# Patient Record
Sex: FEMALE | Race: WHITE | NOT HISPANIC OR LATINO | Employment: OTHER | URBAN - METROPOLITAN AREA
[De-identification: names, ages, dates, MRNs, and addresses within clinical notes are randomized per-mention and may not be internally consistent; named-entity substitution may affect disease eponyms.]

---

## 2017-06-02 ENCOUNTER — INITIAL CONSULT (OUTPATIENT)
Dept: OPHTHALMOLOGY | Facility: CLINIC | Age: 67
End: 2017-06-02
Payer: MEDICARE

## 2017-06-02 ENCOUNTER — CLINICAL SUPPORT (OUTPATIENT)
Dept: OPHTHALMOLOGY | Facility: CLINIC | Age: 67
End: 2017-06-02
Payer: MEDICARE

## 2017-06-02 DIAGNOSIS — H47.323 DRUSEN OF BOTH OPTIC DISCS: ICD-10-CM

## 2017-06-02 DIAGNOSIS — H53.433 ARCUATE VISUAL FIELD DEFECT OF BOTH EYES: ICD-10-CM

## 2017-06-02 DIAGNOSIS — H40.1133 PRIMARY OPEN ANGLE GLAUCOMA OF BOTH EYES, SEVERE STAGE: ICD-10-CM

## 2017-06-02 PROCEDURE — 92083 EXTENDED VISUAL FIELD XM: CPT | Mod: 26,S$PBB,, | Performed by: OPHTHALMOLOGY

## 2017-06-02 PROCEDURE — 92083 EXTENDED VISUAL FIELD XM: CPT | Mod: PBBFAC | Performed by: OPHTHALMOLOGY

## 2017-06-02 PROCEDURE — 99999 PR PBB SHADOW E&M-EST. PATIENT-LVL III: CPT | Mod: PBBFAC,,, | Performed by: OPHTHALMOLOGY

## 2017-06-02 PROCEDURE — 99213 OFFICE O/P EST LOW 20 MIN: CPT | Mod: PBBFAC | Performed by: OPHTHALMOLOGY

## 2017-06-02 PROCEDURE — 92004 COMPRE OPH EXAM NEW PT 1/>: CPT | Mod: S$PBB,,, | Performed by: OPHTHALMOLOGY

## 2017-06-02 RX ORDER — LISINOPRIL 5 MG/1
5 TABLET ORAL
COMMUNITY

## 2017-06-02 RX ORDER — BRINZOLAMIDE 10 MG/ML
1 SUSPENSION/ DROPS OPHTHALMIC 3 TIMES DAILY
COMMUNITY

## 2017-06-02 RX ORDER — PNV NO.95/FERROUS FUM/FOLIC AC 28MG-0.8MG
1000 TABLET ORAL
COMMUNITY

## 2017-06-02 RX ORDER — FAMOTIDINE 40 MG/1
40 TABLET, FILM COATED ORAL
COMMUNITY

## 2017-06-02 RX ORDER — LANSOPRAZOLE 30 MG/1
30 CAPSULE, DELAYED RELEASE ORAL
COMMUNITY

## 2017-06-02 NOTE — LETTER
June 2, 2017      Ranjith Christensen MD  [address] [place] [address]           [facility]hthalmology  [address]  [place] [address]  Phone: [phone]  Fax: [phone]          Patient: Cande Adame   MR Number: [id number]   YOB: 1950   Date of Visit: 6/2/2017       Dear Dr. Ranjith Christensen:    Thank you for referring Cande Adame to me for evaluation. Attached you will find relevant portions of my assessment and plan of care.    If you have questions, please do not hesitate to call me. I look forward to following Cande Adame along with you.    Sincerely,    Richard Dugan MD    Enclosure  CC:  No Recipients    If you would like to receive this communication electronically, please contact [email or web address] or [phone] to request more information on [employer] access.    For providers and/or their staff who would like to refer a patient to [facility], please contact us through our one-stop-shop provider referral line, [facility], at [phone].    If you feel you have received this communication in error or would no longer like to receive these types of communications, please e-mail [email or web address]

## 2017-06-02 NOTE — PROGRESS NOTES
HPI     Concerns About Ocular Health    Additional comments: ION           Comments   Referred by   Hx of Ischemic Optic Neuropathy x 6 years. Hx of Glaucoma  Pt states no noticeable change in vision, but do have some difficulty at   dist at times.  No pain.  Review HVF  Eye Drops:Azopt 2-3x daily OU  Notes in Media    I have personally interviewed the patient, reviewed the history and   examined the patient and agree with the technician's exam.       Last edited by Richard Dugan MD on 6/2/2017  8:55 AM. (History)        ROS     Positive for: Cardiovascular, Eyes    Negative for: Constitutional, Gastrointestinal, Neurological, Skin,   Genitourinary, Musculoskeletal, HENT, Endocrine, Respiratory, Psychiatric,   Allergic/Imm, Heme/Lymph    Last edited by Richard Dugan MD on 6/2/2017  8:55 AM. (History)        Assessment /Plan     For exam results, see Encounter Report.    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 Adame had elevated intraocular pressure today but this may be the result of recent treatments with injected corticosteroids for bronchitis. I will recommend to Dr. Christensen that he be aggressive in treating the glaucoma since the drusen weaken the optic nerve. She will return to me as requested.